# Patient Record
(demographics unavailable — no encounter records)

---

## 2017-01-27 NOTE — TELEPHONE ENCOUNTER
Last office visit 09/20/2016. Last refill date 06/06/2016. Pt is requesting a refill on trazodone 150mg #90 q p.m.

## 2017-01-30 RX ORDER — TRAZODONE HYDROCHLORIDE 150 MG/1
150 TABLET ORAL NIGHTLY
Qty: 90 TABLET | Refills: 0 | Status: SHIPPED | OUTPATIENT
Start: 2017-01-30

## 2017-01-30 NOTE — TELEPHONE ENCOUNTER
Last office visit 09/20/2016. Last refill date 06/06/2016. Pt is requesting a refill on trazodone 150mg qd #90